# Patient Record
Sex: MALE | Race: WHITE | NOT HISPANIC OR LATINO | URBAN - METROPOLITAN AREA
[De-identification: names, ages, dates, MRNs, and addresses within clinical notes are randomized per-mention and may not be internally consistent; named-entity substitution may affect disease eponyms.]

---

## 2023-11-30 ENCOUNTER — HOSPITAL ENCOUNTER (OUTPATIENT)
Dept: CARDIOLOGY | Facility: HOSPITAL | Age: 63
Discharge: HOME | End: 2023-11-30
Payer: MEDICARE

## 2023-11-30 DIAGNOSIS — I49.01 VENTRICULAR FIBRILLATION (MULTI): ICD-10-CM

## 2023-11-30 DIAGNOSIS — Z95.810 PRESENCE OF AUTOMATIC CARDIOVERTER/DEFIBRILLATOR (AICD): ICD-10-CM

## 2023-11-30 DIAGNOSIS — Z95.810 PRESENCE OF AUTOMATIC CARDIOVERTER/DEFIBRILLATOR (AICD): Primary | ICD-10-CM

## 2023-11-30 PROCEDURE — 93295 DEV INTERROG REMOTE 1/2/MLT: CPT | Performed by: INTERNAL MEDICINE

## 2023-11-30 PROCEDURE — 93296 REM INTERROG EVL PM/IDS: CPT

## 2024-03-05 ENCOUNTER — HOSPITAL ENCOUNTER (OUTPATIENT)
Dept: CARDIOLOGY | Facility: HOSPITAL | Age: 64
Discharge: HOME | End: 2024-03-05
Payer: MEDICARE

## 2024-03-05 DIAGNOSIS — Z95.810 PRESENCE OF AUTOMATIC CARDIOVERTER/DEFIBRILLATOR (AICD): ICD-10-CM

## 2024-03-05 DIAGNOSIS — I49.01 VENTRICULAR FIBRILLATION (MULTI): ICD-10-CM

## 2024-03-05 PROCEDURE — 93295 DEV INTERROG REMOTE 1/2/MLT: CPT | Performed by: INTERNAL MEDICINE

## 2024-03-05 PROCEDURE — 93296 REM INTERROG EVL PM/IDS: CPT

## 2024-06-11 ENCOUNTER — HOSPITAL ENCOUNTER (OUTPATIENT)
Dept: CARDIOLOGY | Facility: HOSPITAL | Age: 64
Discharge: HOME | End: 2024-06-11
Payer: MEDICARE

## 2024-06-11 DIAGNOSIS — I49.01 VENTRICULAR FIBRILLATION (MULTI): ICD-10-CM

## 2024-06-11 DIAGNOSIS — Z95.810 PRESENCE OF AUTOMATIC CARDIOVERTER/DEFIBRILLATOR (AICD): ICD-10-CM

## 2024-06-11 PROCEDURE — 93296 REM INTERROG EVL PM/IDS: CPT

## 2024-09-03 ENCOUNTER — APPOINTMENT (OUTPATIENT)
Dept: CARDIOLOGY | Facility: CLINIC | Age: 64
End: 2024-09-03
Payer: MEDICARE

## 2024-09-18 ENCOUNTER — HOSPITAL ENCOUNTER (OUTPATIENT)
Dept: CARDIOLOGY | Facility: HOSPITAL | Age: 64
Discharge: HOME | End: 2024-09-18
Payer: MEDICARE

## 2024-09-18 DIAGNOSIS — I49.01 VENTRICULAR FIBRILLATION (MULTI): ICD-10-CM

## 2024-09-18 DIAGNOSIS — Z95.810 PRESENCE OF AUTOMATIC CARDIOVERTER/DEFIBRILLATOR (AICD): ICD-10-CM

## 2024-09-18 PROCEDURE — 93296 REM INTERROG EVL PM/IDS: CPT

## 2024-09-18 PROCEDURE — 93295 DEV INTERROG REMOTE 1/2/MLT: CPT | Performed by: INTERNAL MEDICINE

## 2024-12-23 ENCOUNTER — TELEPHONE (OUTPATIENT)
Dept: CARDIOLOGY | Facility: CLINIC | Age: 64
End: 2024-12-23

## 2024-12-23 ENCOUNTER — HOSPITAL ENCOUNTER (OUTPATIENT)
Dept: CARDIOLOGY | Facility: HOSPITAL | Age: 64
Discharge: HOME | End: 2024-12-23
Payer: MEDICARE

## 2024-12-23 DIAGNOSIS — Z95.810 PRESENCE OF AUTOMATIC CARDIOVERTER/DEFIBRILLATOR (AICD): ICD-10-CM

## 2024-12-23 DIAGNOSIS — I49.01 VENTRICULAR FIBRILLATION (MULTI): ICD-10-CM

## 2024-12-23 PROCEDURE — 93296 REM INTERROG EVL PM/IDS: CPT

## 2024-12-23 PROCEDURE — 93295 DEV INTERROG REMOTE 1/2/MLT: CPT | Performed by: INTERNAL MEDICINE

## 2025-03-26 ENCOUNTER — HOSPITAL ENCOUNTER (OUTPATIENT)
Dept: CARDIOLOGY | Facility: HOSPITAL | Age: 65
Discharge: HOME | End: 2025-03-26
Payer: MEDICARE

## 2025-03-26 DIAGNOSIS — Z95.810 PRESENCE OF AUTOMATIC CARDIOVERTER/DEFIBRILLATOR (AICD): ICD-10-CM

## 2025-03-26 DIAGNOSIS — I49.01 VENTRICULAR FIBRILLATION (MULTI): ICD-10-CM

## 2025-03-26 PROCEDURE — 93296 REM INTERROG EVL PM/IDS: CPT

## 2025-04-07 DIAGNOSIS — I49.01 VENTRICULAR FIBRILLATION, PAROXYSMAL (MULTI): Primary | ICD-10-CM

## 2025-04-07 DIAGNOSIS — Z95.810 CARDIAC DEFIBRILLATOR IN PLACE: ICD-10-CM

## 2025-04-11 ENCOUNTER — APPOINTMENT (OUTPATIENT)
Dept: CARDIOLOGY | Facility: CLINIC | Age: 65
End: 2025-04-11
Payer: MEDICARE

## 2025-04-24 ENCOUNTER — APPOINTMENT (OUTPATIENT)
Dept: CARDIOLOGY | Facility: CLINIC | Age: 65
End: 2025-04-24
Payer: MEDICARE

## 2025-04-25 ENCOUNTER — APPOINTMENT (OUTPATIENT)
Dept: CARDIOLOGY | Facility: CLINIC | Age: 65
End: 2025-04-25
Payer: MEDICARE

## 2025-05-09 PROBLEM — E66.811 CLASS 1 OBESITY WITH BODY MASS INDEX (BMI) OF 30.0 TO 30.9 IN ADULT: Status: ACTIVE | Noted: 2025-05-09

## 2025-05-09 PROBLEM — I46.9 CARDIAC ARREST: Status: ACTIVE | Noted: 2025-05-09

## 2025-05-09 PROBLEM — E66.9 OBESITY: Status: ACTIVE | Noted: 2025-05-09

## 2025-05-09 PROBLEM — I10 ESSENTIAL HYPERTENSION: Status: ACTIVE | Noted: 2017-07-17

## 2025-05-09 PROBLEM — J30.2 SEASONAL ALLERGIES: Status: ACTIVE | Noted: 2025-05-09

## 2025-05-09 PROBLEM — F41.1 GAD (GENERALIZED ANXIETY DISORDER): Status: ACTIVE | Noted: 2025-05-09

## 2025-05-09 PROBLEM — Z95.810 ICD (IMPLANTABLE CARDIOVERTER-DEFIBRILLATOR) IN PLACE: Status: ACTIVE | Noted: 2022-01-20

## 2025-05-09 PROBLEM — E78.5 HYPERLIPIDEMIA: Status: ACTIVE | Noted: 2025-05-09

## 2025-05-09 PROBLEM — I21.9 MYOCARDIAL INFARCTION (MULTI): Status: ACTIVE | Noted: 2025-05-09

## 2025-05-09 PROBLEM — I25.10 ARTERIOSCLEROSIS OF CORONARY ARTERY: Status: ACTIVE | Noted: 2025-05-09

## 2025-05-09 PROBLEM — K21.9 GERD (GASTROESOPHAGEAL REFLUX DISEASE): Status: ACTIVE | Noted: 2025-05-09

## 2025-05-09 PROBLEM — F41.9 ANXIETY: Status: ACTIVE | Noted: 2025-05-09

## 2025-05-29 ENCOUNTER — APPOINTMENT (OUTPATIENT)
Dept: CARDIOLOGY | Facility: CLINIC | Age: 65
End: 2025-05-29
Payer: MEDICARE

## 2025-05-29 VITALS
BODY MASS INDEX: 30.29 KG/M2 | HEIGHT: 74 IN | OXYGEN SATURATION: 96 % | SYSTOLIC BLOOD PRESSURE: 104 MMHG | DIASTOLIC BLOOD PRESSURE: 58 MMHG | WEIGHT: 236 LBS | HEART RATE: 62 BPM

## 2025-05-29 DIAGNOSIS — E78.5 HYPERLIPIDEMIA, UNSPECIFIED HYPERLIPIDEMIA TYPE: ICD-10-CM

## 2025-05-29 DIAGNOSIS — I10 ESSENTIAL HYPERTENSION: ICD-10-CM

## 2025-05-29 DIAGNOSIS — Z95.810 ICD (IMPLANTABLE CARDIOVERTER-DEFIBRILLATOR) IN PLACE: Primary | ICD-10-CM

## 2025-05-29 RX ORDER — ACETAMINOPHEN 325 MG/1
TABLET ORAL
COMMUNITY

## 2025-05-29 RX ORDER — PANTOPRAZOLE SODIUM 40 MG/1
40 TABLET, DELAYED RELEASE ORAL
COMMUNITY

## 2025-05-29 RX ORDER — ALPRAZOLAM 0.5 MG/1
TABLET ORAL
COMMUNITY

## 2025-05-29 RX ORDER — ASPIRIN 81 MG/1
81 TABLET ORAL DAILY
COMMUNITY

## 2025-05-29 RX ORDER — METOPROLOL TARTRATE 25 MG/1
25 TABLET, FILM COATED ORAL 2 TIMES DAILY
COMMUNITY

## 2025-05-29 RX ORDER — ATORVASTATIN CALCIUM 40 MG/1
40 TABLET, FILM COATED ORAL
COMMUNITY
Start: 2024-12-12

## 2025-05-29 RX ORDER — NIFEDIPINE 30 MG/1
30 TABLET, EXTENDED RELEASE ORAL DAILY
COMMUNITY

## 2025-05-29 RX ORDER — ALBUTEROL SULFATE 90 UG/1
INHALANT RESPIRATORY (INHALATION)
COMMUNITY

## 2025-05-29 ASSESSMENT — ENCOUNTER SYMPTOMS
LOSS OF SENSATION IN FEET: 0
DEPRESSION: 0
OCCASIONAL FEELINGS OF UNSTEADINESS: 0

## 2025-05-29 NOTE — PROGRESS NOTES
"    Cardiac Electrophysiology Office Visit     Referred by Dr. King, Levy JOSHUA MD for No chief complaint on file.    HPI:  Stephen Arteaga is a 64 y.o. year old male patient with h/o HTN, SCA s/p dcICD presenting today to establish care    Objective  Current Outpatient Medications   Medication Instructions    acetaminophen (Tylenol) 325 mg tablet oral    albuterol 90 mcg/actuation inhaler INHALE 2 PUFFS AS INSTRUCTED FOUR TIMES A DAY AS NEEDED. (DISPENSE 3 INHALERS PLEASE)    ALPRAZolam (Xanax) 0.5 mg tablet TAKE 1 TABLET BY MOUTH DAILY AS NEEDED (PANIC ATTACK) FOR UP  DAYS.    aspirin 81 mg, Daily    atorvastatin (LIPITOR) 40 mg, Daily RT    metoprolol tartrate (LOPRESSOR) 25 mg, oral, 2 times daily    NIFEdipine ER (ADALAT CC) 30 mg, oral, Daily    pantoprazole (PROTONIX) 40 mg, oral, Daily before breakfast         Visit Vitals  /58 (BP Location: Left arm, Patient Position: Sitting)   Pulse 62   Ht 1.88 m (6' 2\")   Wt 107 kg (236 lb)   SpO2 96%   BMI 30.30 kg/m²   Smoking Status Never   BSA 2.36 m²      Physical Exam  Constitutional:       Appearance: Normal appearance.   HENT:      Head: Normocephalic.   Cardiovascular:      Rate and Rhythm: Normal rate and regular rhythm.      Pulses: Normal pulses.      Heart sounds: No murmur heard.     Comments: left sided implant healed well, no ecchymosis, hematoma or drainage noted  Pulmonary:      Effort: Pulmonary effort is normal. No respiratory distress.   Musculoskeletal:         General: No swelling.   Skin:     General: Skin is warm and dry.   Neurological:      Mental Status: He is alert.   Psychiatric:         Mood and Affect: Mood normal.           My Interpretation of Reviewed Study(s):  Results      Assessment & Plan  Cardiac arrest s/p dcICD ([facility] 2016)  HTN  HLD  Patient has a [employer] Dual chamber ICD.  Anticipated battery longevity 7yrs (as of 4/11/25).    RA pacing 11%, RV pacing 0%.   Arrhythmias noted on interrogation: None  Lead " parameters stable with steady impedance and thresholds noted: Stable  Cardiac arrest in 2016 with subsequent defibrillator placement. No shocks or events recorded since placement.   - Continue metoprolol 25 mg twice daily, nifedipine 30 mg once daily, aspirin 81 mg daily, and Lipitor 40 mg daily.    Return to Clinic: Patient should return to the [facility] in 1 year    Navid Chiang MD formerly [facility]  Cardiac Electrophysiology  [email or web address]    **Disclaimer: This note was dictated by speech recognition, and every effort has been made to prevent any error in transcription, however minor errors may be present**    This medical note was created with the assistance of artificial intelligence (AI) for documentation purposes. The content has been reviewed and confirmed by the healthcare provider for accuracy and completeness. Patient consented to the use of audio recording and use of AI during their visit.

## 2025-07-21 DIAGNOSIS — I49.01 VENTRICULAR FIBRILLATION AND FLUTTER: ICD-10-CM

## 2025-07-21 DIAGNOSIS — I49.02 VENTRICULAR FIBRILLATION AND FLUTTER: ICD-10-CM

## 2025-07-21 DIAGNOSIS — Z95.810 CARDIAC DEFIBRILLATOR IN PLACE: Primary | ICD-10-CM

## 2025-07-25 ENCOUNTER — HOSPITAL ENCOUNTER (OUTPATIENT)
Dept: CARDIOLOGY | Facility: CLINIC | Age: 65
Discharge: HOME | End: 2025-07-25
Payer: MEDICARE

## 2025-07-25 DIAGNOSIS — I49.01 VF (VENTRICULAR FIBRILLATION) (MULTI): ICD-10-CM

## 2025-07-25 PROCEDURE — 93283 PRGRMG EVAL IMPLANTABLE DFB: CPT

## 2025-07-25 PROCEDURE — 93283 PRGRMG EVAL IMPLANTABLE DFB: CPT | Performed by: NURSE PRACTITIONER

## 2026-05-28 ENCOUNTER — APPOINTMENT (OUTPATIENT)
Dept: CARDIOLOGY | Facility: CLINIC | Age: 66
End: 2026-05-28
Payer: MEDICARE